# Patient Record
Sex: FEMALE | Race: WHITE | NOT HISPANIC OR LATINO | Employment: OTHER | URBAN - METROPOLITAN AREA
[De-identification: names, ages, dates, MRNs, and addresses within clinical notes are randomized per-mention and may not be internally consistent; named-entity substitution may affect disease eponyms.]

---

## 2017-02-16 ENCOUNTER — FOLLOW UP (OUTPATIENT)
Dept: URBAN - METROPOLITAN AREA CLINIC 26 | Facility: CLINIC | Age: 77
End: 2017-02-16

## 2017-02-16 VITALS — DIASTOLIC BLOOD PRESSURE: 82 MMHG | HEART RATE: 68 BPM | SYSTOLIC BLOOD PRESSURE: 120 MMHG | HEIGHT: 55 IN

## 2017-02-16 DIAGNOSIS — H43.811: ICD-10-CM

## 2017-02-16 DIAGNOSIS — H35.3113: ICD-10-CM

## 2017-02-16 DIAGNOSIS — Z97.0: ICD-10-CM

## 2017-02-16 PROCEDURE — 92250 FUNDUS PHOTOGRAPHY W/I&R: CPT

## 2017-02-16 PROCEDURE — 92014 COMPRE OPH EXAM EST PT 1/>: CPT

## 2017-02-16 PROCEDURE — 92225 OPHTHALMOSCOPY (INITIAL): CPT

## 2017-02-16 PROCEDURE — 92235 FLUORESCEIN ANGRPH MLTIFRAME: CPT

## 2017-02-16 ASSESSMENT — VISUAL ACUITY: OD_SC: 20/60-1

## 2017-02-16 ASSESSMENT — TONOMETRY: OD_IOP_MMHG: 15

## 2017-08-17 ENCOUNTER — FOLLOW UP (OUTPATIENT)
Dept: URBAN - METROPOLITAN AREA CLINIC 26 | Facility: CLINIC | Age: 77
End: 2017-08-17

## 2017-08-17 VITALS — SYSTOLIC BLOOD PRESSURE: 134 MMHG | HEART RATE: 80 BPM | DIASTOLIC BLOOD PRESSURE: 76 MMHG | HEIGHT: 55 IN

## 2017-08-17 DIAGNOSIS — H43.811: ICD-10-CM

## 2017-08-17 DIAGNOSIS — H35.3113: ICD-10-CM

## 2017-08-17 DIAGNOSIS — Z97.0: ICD-10-CM

## 2017-08-17 DIAGNOSIS — H53.8: ICD-10-CM

## 2017-08-17 PROCEDURE — 92250 FUNDUS PHOTOGRAPHY W/I&R: CPT

## 2017-08-17 PROCEDURE — 92235 FLUORESCEIN ANGRPH MLTIFRAME: CPT

## 2017-08-17 PROCEDURE — 92014 COMPRE OPH EXAM EST PT 1/>: CPT

## 2017-08-17 ASSESSMENT — TONOMETRY: OD_IOP_MMHG: 15

## 2017-08-17 ASSESSMENT — VISUAL ACUITY: OD_SC: 20/50-2

## 2018-02-16 ENCOUNTER — FOLLOW UP (OUTPATIENT)
Dept: URBAN - METROPOLITAN AREA CLINIC 26 | Facility: CLINIC | Age: 78
End: 2018-02-16

## 2018-02-16 VITALS — SYSTOLIC BLOOD PRESSURE: 136 MMHG | HEIGHT: 55 IN | DIASTOLIC BLOOD PRESSURE: 86 MMHG | HEART RATE: 76 BPM

## 2018-02-16 DIAGNOSIS — H43.811: ICD-10-CM

## 2018-02-16 DIAGNOSIS — H53.8: ICD-10-CM

## 2018-02-16 DIAGNOSIS — H35.3113: ICD-10-CM

## 2018-02-16 PROCEDURE — 92235 FLUORESCEIN ANGRPH MLTIFRAME: CPT

## 2018-02-16 PROCEDURE — 92250 FUNDUS PHOTOGRAPHY W/I&R: CPT

## 2018-02-16 PROCEDURE — 92134 CPTRZ OPH DX IMG PST SGM RTA: CPT

## 2018-02-16 PROCEDURE — 92014 COMPRE OPH EXAM EST PT 1/>: CPT

## 2018-02-16 ASSESSMENT — TONOMETRY: OD_IOP_MMHG: 13

## 2018-02-16 ASSESSMENT — VISUAL ACUITY: OD_SC: 20/30-1

## 2018-08-17 ENCOUNTER — FOLLOW UP (OUTPATIENT)
Dept: URBAN - METROPOLITAN AREA CLINIC 26 | Facility: CLINIC | Age: 78
End: 2018-08-17

## 2018-08-17 VITALS
BODY MASS INDEX: 42 KG/M2 | HEIGHT: 64 IN | DIASTOLIC BLOOD PRESSURE: 80 MMHG | SYSTOLIC BLOOD PRESSURE: 150 MMHG | HEART RATE: 62 BPM | WEIGHT: 246 LBS

## 2018-08-17 DIAGNOSIS — H53.8: ICD-10-CM

## 2018-08-17 DIAGNOSIS — H35.3113: ICD-10-CM

## 2018-08-17 DIAGNOSIS — H43.811: ICD-10-CM

## 2018-08-17 PROCEDURE — 92235 FLUORESCEIN ANGRPH MLTIFRAME: CPT

## 2018-08-17 PROCEDURE — 92250 FUNDUS PHOTOGRAPHY W/I&R: CPT

## 2018-08-17 PROCEDURE — 92014 COMPRE OPH EXAM EST PT 1/>: CPT

## 2018-08-17 ASSESSMENT — TONOMETRY: OD_IOP_MMHG: 14

## 2018-08-17 ASSESSMENT — VISUAL ACUITY: OD_CC: 20/40-2

## 2019-02-15 ENCOUNTER — FOLLOW UP (OUTPATIENT)
Dept: URBAN - METROPOLITAN AREA CLINIC 26 | Facility: CLINIC | Age: 79
End: 2019-02-15

## 2019-02-15 VITALS
DIASTOLIC BLOOD PRESSURE: 76 MMHG | BODY MASS INDEX: 45.08 KG/M2 | SYSTOLIC BLOOD PRESSURE: 138 MMHG | WEIGHT: 245 LBS | HEIGHT: 62 IN | HEART RATE: 66 BPM

## 2019-02-15 DIAGNOSIS — Z97.0: ICD-10-CM

## 2019-02-15 DIAGNOSIS — H35.3113: ICD-10-CM

## 2019-02-15 DIAGNOSIS — H43.811: ICD-10-CM

## 2019-02-15 DIAGNOSIS — H53.8: ICD-10-CM

## 2019-02-15 PROCEDURE — 92014 COMPRE OPH EXAM EST PT 1/>: CPT

## 2019-02-15 PROCEDURE — 92250 FUNDUS PHOTOGRAPHY W/I&R: CPT

## 2019-02-15 PROCEDURE — 92235 FLUORESCEIN ANGRPH MLTIFRAME: CPT

## 2019-02-15 ASSESSMENT — VISUAL ACUITY: OD_CC: 20/30-1

## 2019-02-15 ASSESSMENT — TONOMETRY: OD_IOP_MMHG: 14

## 2019-08-13 ENCOUNTER — FOLLOW UP (OUTPATIENT)
Dept: URBAN - METROPOLITAN AREA CLINIC 26 | Facility: CLINIC | Age: 79
End: 2019-08-13

## 2019-08-13 DIAGNOSIS — Z97.0: ICD-10-CM

## 2019-08-13 DIAGNOSIS — H35.3113: ICD-10-CM

## 2019-08-13 DIAGNOSIS — H43.811: ICD-10-CM

## 2019-08-13 DIAGNOSIS — H53.8: ICD-10-CM

## 2019-08-13 PROCEDURE — 92014 COMPRE OPH EXAM EST PT 1/>: CPT

## 2019-08-13 PROCEDURE — 92250 FUNDUS PHOTOGRAPHY W/I&R: CPT

## 2019-08-13 PROCEDURE — 92235 FLUORESCEIN ANGRPH MLTIFRAME: CPT

## 2019-08-13 ASSESSMENT — VISUAL ACUITY: OD_CC: 20/50

## 2019-08-13 ASSESSMENT — TONOMETRY: OD_IOP_MMHG: 20

## 2020-11-05 NOTE — PATIENT DISCUSSION
The cataracts are creating some visual symptoms that are tolerable at this time. Advised Pt of condition of cataracts and option of CE one becomes more bothersome. Discussed symptoms of worsening and becoming more bothersome. Pt to call with vision changes or increased symptoms before next appt.

## 2020-11-05 NOTE — PATIENT DISCUSSION
Explained PAVAN and recommended regular use of artificial tears 3-4 times per day. Refresh Relieva or Blink safe for use with or without CL. Sample given today.

## 2021-11-08 NOTE — PATIENT DISCUSSION
The cataracts are creating some visual symptoms that Pt reports are still tolerable at this time. Advised Pt of condition of cataracts. Discussed option of CE to improve visual function once Pt becomes more bothered. Discussed symptoms of worsening and becoming more bothersome. Pt to call with vision changes or increased symptoms before next appt.

## 2021-12-13 ENCOUNTER — OFFICE VISIT (OUTPATIENT)
Dept: URBAN - METROPOLITAN AREA CLINIC 121 | Facility: CLINIC | Age: 81
End: 2021-12-13

## 2022-05-06 ENCOUNTER — OFFICE VISIT (OUTPATIENT)
Dept: URBAN - METROPOLITAN AREA CLINIC 63 | Facility: CLINIC | Age: 82
End: 2022-05-06

## 2022-07-09 ENCOUNTER — TELEPHONE ENCOUNTER (OUTPATIENT)
Dept: URBAN - METROPOLITAN AREA CLINIC 121 | Facility: CLINIC | Age: 82
End: 2022-07-09

## 2022-07-09 RX ORDER — DOCUSATE SODIUM 100 MG/1
CAPSULE ORAL
Refills: 0 | OUTPATIENT
Start: 2022-05-06 | End: 2022-05-06

## 2022-07-09 RX ORDER — DOCUSATE SODIUM 100 MG/1
CAPSULE ORAL
Refills: 0 | OUTPATIENT
Start: 2022-05-05 | End: 2022-05-06

## 2022-07-09 RX ORDER — AMLODIPINE BESYLATE 5 MG/1
TABLET ORAL
Refills: 0 | OUTPATIENT
Start: 2022-05-05 | End: 2022-05-06

## 2022-07-09 RX ORDER — MECLIZINE HYDROCHLORIDE 25 MG/1
TABLET ORAL
Refills: 0 | OUTPATIENT
Start: 2022-05-05 | End: 2022-05-06

## 2022-07-09 RX ORDER — ASPIRIN 81 MG/1
TABLET, COATED ORAL
Refills: 0 | OUTPATIENT
Start: 2022-05-05 | End: 2022-05-06

## 2022-07-09 RX ORDER — LORATADINE 10 MG
TABLET,DISINTEGRATING ORAL
Refills: 0 | OUTPATIENT
Start: 2022-05-05 | End: 2022-05-06

## 2022-07-09 RX ORDER — MECLIZINE HYDROCHLORIDE 25 MG/1
TABLET ORAL ONCE A DAY
Refills: 0 | OUTPATIENT
Start: 2022-05-06 | End: 2022-05-06

## 2022-07-09 RX ORDER — LORATADINE 10 MG
TABLET,DISINTEGRATING ORAL
Refills: 0 | OUTPATIENT
Start: 2022-05-06 | End: 2022-05-06

## 2022-07-10 ENCOUNTER — TELEPHONE ENCOUNTER (OUTPATIENT)
Dept: URBAN - METROPOLITAN AREA CLINIC 121 | Facility: CLINIC | Age: 82
End: 2022-07-10

## 2022-07-10 RX ORDER — OMEPRAZOLE 20 MG/1
TABLET, ORALLY DISINTEGRATING, DELAYED RELEASE ORAL AS NEEDED
Refills: 0 | Status: ACTIVE | COMMUNITY
Start: 2022-05-06

## 2022-07-10 RX ORDER — CHOLECALCIFEROL (VITAMIN D3) 125 MCG
TABLET ORAL
Refills: 0 | Status: ACTIVE | COMMUNITY
Start: 2022-05-06

## 2022-07-10 RX ORDER — ASPIRIN 81 MG/1
TABLET, COATED ORAL ONCE A DAY
Refills: 0 | Status: ACTIVE | COMMUNITY
Start: 2022-05-06

## 2022-07-10 RX ORDER — AMLODIPINE BESYLATE 5 MG/1
TABLET ORAL ONCE A DAY
Refills: 0 | Status: ACTIVE | COMMUNITY
Start: 2022-05-06

## 2023-04-20 ENCOUNTER — OFFICE VISIT (OUTPATIENT)
Dept: URBAN - METROPOLITAN AREA CLINIC 63 | Facility: CLINIC | Age: 83
End: 2023-04-20
Payer: COMMERCIAL

## 2023-04-20 ENCOUNTER — WEB ENCOUNTER (OUTPATIENT)
Dept: URBAN - METROPOLITAN AREA CLINIC 63 | Facility: CLINIC | Age: 83
End: 2023-04-20

## 2023-04-20 ENCOUNTER — DASHBOARD ENCOUNTERS (OUTPATIENT)
Age: 83
End: 2023-04-20

## 2023-04-20 VITALS
HEART RATE: 72 BPM | WEIGHT: 215.2 LBS | TEMPERATURE: 97.2 F | BODY MASS INDEX: 39.6 KG/M2 | OXYGEN SATURATION: 96 % | SYSTOLIC BLOOD PRESSURE: 122 MMHG | HEIGHT: 62 IN | DIASTOLIC BLOOD PRESSURE: 80 MMHG

## 2023-04-20 DIAGNOSIS — K21.9 GASTROESOPHAGEAL REFLUX DISEASE WITHOUT ESOPHAGITIS: ICD-10-CM

## 2023-04-20 DIAGNOSIS — D64.9 ANEMIA, UNSPECIFIED TYPE: ICD-10-CM

## 2023-04-20 PROBLEM — 266435005: Status: ACTIVE | Noted: 2023-04-20

## 2023-04-20 PROCEDURE — 99203 OFFICE O/P NEW LOW 30 MIN: CPT | Performed by: PHYSICIAN ASSISTANT

## 2023-04-20 RX ORDER — ASPIRIN 81 MG/1
TABLET, COATED ORAL ONCE A DAY
Refills: 0 | COMMUNITY
Start: 2022-05-06

## 2023-04-20 RX ORDER — OMEPRAZOLE 20 MG/1
TABLET, ORALLY DISINTEGRATING, DELAYED RELEASE ORAL AS NEEDED
Refills: 0 | COMMUNITY
Start: 2022-05-06

## 2023-04-20 RX ORDER — CHOLECALCIFEROL (VITAMIN D3) 125 MCG
TABLET ORAL
Refills: 0 | COMMUNITY
Start: 2022-05-06

## 2023-04-20 RX ORDER — AMLODIPINE BESYLATE 5 MG/1
TABLET ORAL ONCE A DAY
Refills: 0 | COMMUNITY
Start: 2022-05-06

## 2023-04-20 NOTE — PHYSICAL EXAM CONSTITUTIONAL:
Obese, in no acute distress , ambulating with a walker without difficulty , normal communication ability

## 2023-04-20 NOTE — PHYSICAL EXAM EYES:
No scleral icterus. She is visually impaired. Left ocular prosthesis, Right eye, EOM intact but diminished visual acuity according to patient.

## 2023-04-20 NOTE — HPI-HPI
Tamera is a 83-year-old female who presents for evaluation of anemia, possible EGD.  Patient was last seen in clinic by Dr. Reyes on 5/6/2022. She was evaluated for GERD.  And anemia.  Patient has a history of gastric ulcer that was treated.    Takes fiber 3 times a week. Takes immodium if her stool is loose and senokot if she is constipated. She has one bm every 3 days. Usually goes 3 x a day when she does go.  She is doing very well, going to [employer] [facility] 3x per week, enjoys social activities and exercises with PT. c/o right shoulder pain and reduced range of motion.  She denies any heartburn symptoms. Takes omeprazole at night just before bed and this keeps her symptoms under control.